# Patient Record
Sex: MALE | Race: WHITE | NOT HISPANIC OR LATINO | URBAN - METROPOLITAN AREA
[De-identification: names, ages, dates, MRNs, and addresses within clinical notes are randomized per-mention and may not be internally consistent; named-entity substitution may affect disease eponyms.]

---

## 2023-03-12 ENCOUNTER — OFFICE VISIT (OUTPATIENT)
Dept: URGENT CARE | Facility: CLINIC | Age: 18
End: 2023-03-12
Payer: COMMERCIAL

## 2023-03-12 VITALS
DIASTOLIC BLOOD PRESSURE: 78 MMHG | RESPIRATION RATE: 16 BRPM | HEIGHT: 72 IN | TEMPERATURE: 98 F | HEART RATE: 67 BPM | WEIGHT: 280.8 LBS | OXYGEN SATURATION: 98 % | SYSTOLIC BLOOD PRESSURE: 124 MMHG | BODY MASS INDEX: 38.03 KG/M2

## 2023-03-12 DIAGNOSIS — S61.209A AVULSION OF SKIN OF FINGER WITHOUT COMPLICATION, INITIAL ENCOUNTER: ICD-10-CM

## 2023-03-12 PROCEDURE — 99202 OFFICE O/P NEW SF 15 MIN: CPT | Performed by: NURSE PRACTITIONER

## 2023-03-12 NOTE — PROGRESS NOTES
Subjective     Jose Costa is a 18 y.o. male who presents with Laceration (Xtoday Right pinky finger)            Laceration   The incident occurred less than 1 hour ago. The laceration is located on the Right hand (right pinky). The laceration is 1 cm in size. The laceration mechanism was a metal edge. The pain is mild. He reports no foreign bodies present. His tetanus status is UTD.     Patient has no known allergies.  No current outpatient medications on file prior to visit.     No current facility-administered medications on file prior to visit.     Social History     Socioeconomic History    Marital status: Single     Spouse name: Not on file    Number of children: Not on file    Years of education: Not on file    Highest education level: Not on file   Occupational History    Not on file   Tobacco Use    Smoking status: Never    Smokeless tobacco: Never   Vaping Use    Vaping Use: Never used   Substance and Sexual Activity    Alcohol use: Yes    Drug use: Never    Sexual activity: Not on file   Other Topics Concern    Not on file   Social History Narrative    Not on file     Social Determinants of Health     Financial Resource Strain: Not on file   Food Insecurity: Not on file   Transportation Needs: Not on file   Physical Activity: Not on file   Stress: Not on file   Social Connections: Not on file   Intimate Partner Violence: Not on file   Housing Stability: Not on file     Breast Cancer-related family history is not on file.    Review of Systems   Musculoskeletal:  Negative for myalgias.   Skin:         Skin avulsion to right pinky   Neurological:  Negative for tingling, sensory change and focal weakness.            Objective     /78 (BP Location: Left arm, Patient Position: Sitting)   Pulse 67   Temp 36.7 °C (98 °F) (Temporal)   Resp 16   Ht 1.829 m (6')   Wt (!) 127 kg (280 lb 12.8 oz)   SpO2 98%   BMI 38.08 kg/m²      Physical Exam  Vitals and nursing note reviewed.   Constitutional:        Appearance: Normal appearance.   Cardiovascular:      Rate and Rhythm: Normal rate and regular rhythm.      Heart sounds: No murmur heard.  Pulmonary:      Effort: Pulmonary effort is normal.      Breath sounds: Normal breath sounds.   Musculoskeletal:         General: Normal range of motion.   Skin:     General: Skin is warm and dry.      Comments: Patient with skin avulsion to the tip of his right pinky finger.  Minimal bleeding noted however we did apply some epinephrine for localized and to get the bleeding stopped.  Wound was dressed and patient given discharge instructions.   Neurological:      General: No focal deficit present.      Mental Status: He is alert and oriented to person, place, and time.   Psychiatric:         Mood and Affect: Mood normal.                           Assessment & Plan        1. Avulsion of skin of finger without complication, initial encounter        Differential diagnosis, natural history, supportive care, and indications for immediate follow-up were discussed.

## 2023-03-18 ASSESSMENT — ENCOUNTER SYMPTOMS
SENSORY CHANGE: 0
FOCAL WEAKNESS: 0
MYALGIAS: 0
TINGLING: 0

## 2025-04-02 ENCOUNTER — NON-PROVIDER VISIT (OUTPATIENT)
Dept: URGENT CARE | Facility: CLINIC | Age: 20
End: 2025-04-02

## 2025-04-02 DIAGNOSIS — Z02.1 PRE-EMPLOYMENT DRUG SCREENING: ICD-10-CM

## 2025-04-02 LAB
AMP AMPHETAMINE: NORMAL
COC COCAINE: NORMAL
INT CON NEG: NORMAL
INT CON POS: NORMAL
MET METHAMPHETAMINES: NORMAL
OPI OPIATES: NORMAL
PCP PHENCYCLIDINE: NORMAL
POC DRUG COMMENT 753798-OCCUPATIONAL HEALTH: NEGATIVE
THC: NORMAL

## 2025-04-02 PROCEDURE — 80305 DRUG TEST PRSMV DIR OPT OBS: CPT
